# Patient Record
Sex: MALE | Race: NATIVE HAWAIIAN OR OTHER PACIFIC ISLANDER | HISPANIC OR LATINO
[De-identification: names, ages, dates, MRNs, and addresses within clinical notes are randomized per-mention and may not be internally consistent; named-entity substitution may affect disease eponyms.]

---

## 2022-04-26 ENCOUNTER — TRANSCRIPTION ENCOUNTER (OUTPATIENT)
Age: 47
End: 2022-04-26

## 2022-09-12 PROBLEM — Z00.00 ENCOUNTER FOR PREVENTIVE HEALTH EXAMINATION: Status: ACTIVE | Noted: 2022-09-12

## 2022-10-03 ENCOUNTER — APPOINTMENT (OUTPATIENT)
Dept: COLORECTAL SURGERY | Facility: CLINIC | Age: 47
End: 2022-10-03

## 2023-04-17 ENCOUNTER — APPOINTMENT (OUTPATIENT)
Dept: ORTHOPEDIC SURGERY | Facility: CLINIC | Age: 48
End: 2023-04-17
Payer: COMMERCIAL

## 2023-04-17 PROCEDURE — 99203 OFFICE O/P NEW LOW 30 MIN: CPT

## 2023-04-17 PROCEDURE — 73562 X-RAY EXAM OF KNEE 3: CPT | Mod: LT

## 2023-04-17 NOTE — HISTORY OF PRESENT ILLNESS
[7] : 7 [Dull/Aching] : dull/aching [Throbbing] : throbbing [Constant] : constant [Ice] : ice [Sitting] : sitting [Standing] : standing [Stairs] : stairs [de-identified] : 47-year-old male presents the office today for evaluation of acute left knee pain.  The patient reports that on 4/13/2023 he was walking across the street when he noticed sudden, severe pain and locking in the knee.  The pain was so severe he was unable to walk home.  Since that time his pain has improved considerably.  This is the first time anything like this has happened before. No injuries, numbness or tingling.  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 [FreeTextEntry1] : Left Knee [] : no [FreeTextEntry5] :  COSTA 47 year M is here for Left Knee, approx (04/13/2023), pt states was crossing the street started noticing pain and heard cracking type of sound, dines any injury, having pain when walking up and down the stairs. is putting ice for the pain

## 2023-04-17 NOTE — IMAGING
[de-identified] : Antalgic gait\par \par Left knee\par No effusion\par Mild tenderness at lateral joint line\par ROM: 0 - 110 deg\par + TA/ GS\par SILT throughout\par + Breanne\par - Anterior drawer\par - Pivot shift\par

## 2023-04-17 NOTE — HISTORY OF PRESENT ILLNESS
[7] : 7 [Dull/Aching] : dull/aching [Throbbing] : throbbing [Constant] : constant [Ice] : ice [Sitting] : sitting [Standing] : standing [Stairs] : stairs [de-identified] : 47-year-old male presents the office today for evaluation of acute left knee pain.  The patient reports that on 4/13/2023 he was walking across the street when he noticed sudden, severe pain and locking in the knee.  The pain was so severe he was unable to walk home.  Since that time his pain has improved considerably.  This is the first time anything like this has happened before. No injuries, numbness or tingling.  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, sitting all day. [FreeTextEntry1] : Left Knee [] : no [FreeTextEntry5] :  COSTA 47 year M is here for Left Knee, approx (04/13/2023), pt states was crossing the street started noticing pain and heard cracking type of sound, dines any injury, having pain when walking up and down the stairs. is putting ice for the pain

## 2023-04-17 NOTE — IMAGING
[de-identified] : Antalgic gait\par \par Left knee\par No effusion\par Mild tenderness at lateral joint line\par ROM: 0 - 110 deg\par + TA/ GS\par SILT throughout\par + Breanne\par - Anterior drawer\par - Pivot shift\par

## 2023-04-18 ENCOUNTER — FORM ENCOUNTER (OUTPATIENT)
Age: 48
End: 2023-04-18

## 2023-04-19 ENCOUNTER — APPOINTMENT (OUTPATIENT)
Dept: MRI IMAGING | Facility: CLINIC | Age: 48
End: 2023-04-19
Payer: COMMERCIAL

## 2023-04-19 PROCEDURE — 73721 MRI JNT OF LWR EXTRE W/O DYE: CPT | Mod: LT

## 2023-04-24 ENCOUNTER — APPOINTMENT (OUTPATIENT)
Dept: ORTHOPEDIC SURGERY | Facility: CLINIC | Age: 48
End: 2023-04-24
Payer: COMMERCIAL

## 2023-04-24 DIAGNOSIS — M23.92 UNSPECIFIED INTERNAL DERANGEMENT OF LEFT KNEE: ICD-10-CM

## 2023-04-24 PROCEDURE — 99212 OFFICE O/P EST SF 10 MIN: CPT

## 2023-04-24 NOTE — IMAGING
[de-identified] : Normal gait\par \par Left knee\par No effusion\par ROM: 0 - 110 deg\par + TA/ GS\par SILT throughout\par + Breanne\par - Anterior drawer\par - Pivot shift\par

## 2023-04-24 NOTE — DATA REVIEWED
[FreeTextEntry1] : MRI left knee: Small medial meniscus tear, patellofemoral arthrosis, ruptured popliteal cyst

## 2023-04-24 NOTE — DISCUSSION/SUMMARY
[Medication Risks Reviewed] : Medication risks reviewed [de-identified] : - discussed etiology/ pathophysiology of his complaints\par - reviewed prognosis as well as treatment options should his pain return or worsen\par - weight loss\par - NSAIDs prn pain\par - f/u prn